# Patient Record
Sex: MALE | Race: WHITE | NOT HISPANIC OR LATINO | Employment: OTHER | URBAN - METROPOLITAN AREA
[De-identification: names, ages, dates, MRNs, and addresses within clinical notes are randomized per-mention and may not be internally consistent; named-entity substitution may affect disease eponyms.]

---

## 2021-07-15 ENCOUNTER — NEW PATIENT (OUTPATIENT)
Dept: URBAN - METROPOLITAN AREA CLINIC 11 | Facility: CLINIC | Age: 81
End: 2021-07-15

## 2021-07-15 DIAGNOSIS — H35.433: ICD-10-CM

## 2021-07-15 DIAGNOSIS — H35.3114: ICD-10-CM

## 2021-07-15 DIAGNOSIS — H35.3223: ICD-10-CM

## 2021-07-15 DIAGNOSIS — H43.813: ICD-10-CM

## 2021-07-15 PROCEDURE — 99204 OFFICE O/P NEW MOD 45 MIN: CPT

## 2021-07-15 PROCEDURE — 92134 CPTRZ OPH DX IMG PST SGM RTA: CPT

## 2021-07-15 PROCEDURE — 92201 OPSCPY EXTND RTA DRAW UNI/BI: CPT

## 2021-07-15 ASSESSMENT — VISUAL ACUITY
OS_SC: CF 2FT
OD_SC: CF 3FT

## 2021-07-15 ASSESSMENT — TONOMETRY
OS_IOP_MMHG: 14
OD_IOP_MMHG: 16

## 2021-07-15 NOTE — PATIENT DISCUSSION
35 minutes spent in face to face dialogue with patient and his wife. I have explained that treatment is unlikely to help at this advanced stage. I will refer him to [facility] for low vision evaluation. I will see him on an as needed basis.